# Patient Record
Sex: MALE | Race: WHITE | NOT HISPANIC OR LATINO | Employment: PART TIME | URBAN - METROPOLITAN AREA
[De-identification: names, ages, dates, MRNs, and addresses within clinical notes are randomized per-mention and may not be internally consistent; named-entity substitution may affect disease eponyms.]

---

## 2024-02-02 ENCOUNTER — OFFICE VISIT (OUTPATIENT)
Dept: URGENT CARE | Facility: CLINIC | Age: 64
End: 2024-02-02

## 2024-02-02 VITALS
TEMPERATURE: 101 F | DIASTOLIC BLOOD PRESSURE: 75 MMHG | HEART RATE: 46 BPM | RESPIRATION RATE: 16 BRPM | HEIGHT: 68 IN | SYSTOLIC BLOOD PRESSURE: 121 MMHG | OXYGEN SATURATION: 97 %

## 2024-02-02 DIAGNOSIS — U07.1 COVID-19: ICD-10-CM

## 2024-02-02 DIAGNOSIS — G40.409 OTHER GENERALIZED EPILEPSY, NOT INTRACTABLE, WITHOUT STATUS EPILEPTICUS: ICD-10-CM

## 2024-02-02 DIAGNOSIS — U07.1 COVID-19 VIRUS DETECTED: ICD-10-CM

## 2024-02-02 DIAGNOSIS — R50.9 FEVER, UNSPECIFIED FEVER CAUSE: Primary | ICD-10-CM

## 2024-02-02 LAB
CTP QC/QA: YES
CTP QC/QA: YES
POC MOLECULAR INFLUENZA A AGN: NEGATIVE
POC MOLECULAR INFLUENZA B AGN: NEGATIVE
SARS-COV-2 AG RESP QL IA.RAPID: POSITIVE

## 2024-02-02 PROCEDURE — 87811 SARS-COV-2 COVID19 W/OPTIC: CPT | Mod: QW,S$GLB,, | Performed by: NURSE PRACTITIONER

## 2024-02-02 PROCEDURE — 99204 OFFICE O/P NEW MOD 45 MIN: CPT | Mod: S$GLB,,, | Performed by: NURSE PRACTITIONER

## 2024-02-02 PROCEDURE — 87502 INFLUENZA DNA AMP PROBE: CPT | Mod: QW,S$GLB,, | Performed by: NURSE PRACTITIONER

## 2024-02-02 RX ORDER — IBUPROFEN 200 MG
400 TABLET ORAL
Status: COMPLETED | OUTPATIENT
Start: 2024-02-02 | End: 2024-02-02

## 2024-02-02 RX ORDER — HYDROCHLOROTHIAZIDE 25 MG/1
25 TABLET ORAL DAILY
COMMUNITY

## 2024-02-02 RX ORDER — AMLODIPINE BESYLATE 5 MG/1
5 TABLET ORAL DAILY
COMMUNITY

## 2024-02-02 RX ADMIN — Medication 400 MG: at 01:02

## 2024-02-02 NOTE — PATIENT INSTRUCTIONS
"Fever, unspecified fever cause  -     SARS Coronavirus 2 Antigen, POCT Manual Read  -     POCT Influenza A/B MOLECULAR    -     ibuprofen tablet 400 mg   - given in clinic @ 1:25 pm     COVID-19    Other generalized epilepsy    -     Ambulatory referral/consult to Neurology  Referral ordered.  Call [phone] to schedule appt         STRICT ED Precautions    Report to ED or call 911 if fever, vomiting, mental confusion, neck pain,  vision changes, speech changes, weakness, or sensory loss.        - Rest at home.     - Drink plenty of fluids so you won't get dehydrated.      Avoid taking Decongestants such as Sudafed, pseudoephedrine, phenylephrine or meds that say "cold," "sinus" or "-D".      - Cough recommendations:   Warm tea with honey can help with cough. Honey is a natural cough suppressant.  - Dextromethorphan (DM) is a cough suppressant over the counter (ie. mucinex DM OR delsym).        - Congestion recommendations:    - Mucinex (guaifenesin) twice a day (or as directed) to help loosen mucous.       - Fever/Pain recommendations:  Alternate Tylenol or Ibuprofen as directed for fever/pain.   - Motrin/Ibuprofen every 6-8 hours for pain and inflammation. Do not take ibuprofen if you have a history of GI bleeding, kidney disease, or if you take blood thinners.    - Tylenol/acetaminophen every 6-8 hours for added pain relief.  Avoid tylenol if you have a history of liver disease.       -Sore throat recommendations: Warm fluids, warm salt water gargles, throat lozenges, tea, honey, soup, or drinking something cold or frozen.  Throat lozenges or sprays help reduce pain. Gargling with warm saltwater (1/4 teaspoon of salt in 1/2 cup of warm water) or an OTC anesthetic gargle may be useful for irritation.      - Go to the ER if you develop new or worsening symptoms.      When to seek medical advice  Call your healthcare provider right away if any of these occur:  Fever that is poorly controlled with OTC fever " reducing medication  New or worsening ear pain, sinus pain, or headache  Stiff neck  You can't swallow liquids or you can't open your mouth wide because of throat pain  Signs of dehydration. These include very dark urine or no urine, sunken eyes, and dizziness.  Trouble breathing or noisy breathing  Muffled voice  Rash

## 2024-02-02 NOTE — PROGRESS NOTES
"Subjective:      Patient ID: Julio Shaw is a 63 y.o. male.    Vitals:  height is 5' 8" (1.727 m). His oral temperature is 100.6 °F (38.1 °C) (abnormal). His blood pressure is 121/75 and his pulse is 46 (abnormal). His respiration is 16 and oxygen saturation is 97%.     Chief Complaint: Loss of Consciousness      Pt is a 62 yo male with a friend who is participating in PMH and HPI.  Pt has hx epilepsy and HTN presenting with fever, fatigue, and an episode of passing out that occurred yesterday.  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  Pt reports that the episode after gaining consciousness took about 5 mins.  Pt reports that he struggled opening eyes , moving mouth and trying to speak. Pt reports usually his epileptic seizures are much more severe involving biting the tongue, urination, and total memory loss for ~ 1 hour.  Pt reports his last seizure was ~ 1 hour ago.  Denies chest pain, dyspnea on exertion, irregular heartbeat, blurred vision, leg swelling, and syncope.     Loss of Consciousness  This is a new problem. The current episode started yesterday. Length of episode of loss of consciousness: unknown. Nothing aggravates the symptoms. Associated symptoms include a fever and malaise/fatigue. Pertinent negatives include no abdominal pain, auditory change, aura, back pain, bladder incontinence, bowel incontinence, chest pain, clumsiness, confusion, diaphoresis, dizziness, focal sensory loss, focal weakness, headaches, light-headedness, nausea, palpitations, slurred speech, vertigo, visual change, vomiting or weakness. Associated symptoms comments: Fatigue   . He has tried nothing for the symptoms. The treatment provided no relief. His past medical history is significant for HTN. There is no history of arrhythmia, CAD, a clotting disorder, CVA, DM, seizures, a sudden death in family, TIA or vertigo. hypertension, epiplepsy       Constitution: Positive for fatigue " and fever. Negative for chills and sweating.   HENT:  Negative for ear pain, congestion, sinus pain and sore throat.    Cardiovascular:  Positive for passing out. Negative for chest pain, leg swelling and palpitations.   Respiratory:  Negative for chest tightness, cough, sputum production and shortness of breath.    Gastrointestinal:  Negative for abdominal pain, nausea, vomiting, diarrhea and bowel incontinence.   Genitourinary:  Negative for bladder incontinence.   Musculoskeletal:  Negative for back pain.   Neurological:  Positive for passing out, loss of consciousness and seizures. Negative for dizziness, history of vertigo, light-headedness, facial drooping, focal weakness, speech difficulty, coordination disturbances, loss of balance, headaches, disorientation, altered mental status, numbness and tingling.   Hematologic/Lymphatic: Negative for history of blood clots.   Psychiatric/Behavioral:  Negative for altered mental status, disorientation and confusion.       Objective:     Physical Exam   Constitutional: He is oriented to person, place, and time.   HENT:   Head: Normocephalic.   Ears:   Right Ear: Hearing and external ear normal. No no drainage, swelling or tenderness. Tympanic membrane is not erythematous, not retracted and not bulging. No middle ear effusion.   Left Ear: Hearing and external ear normal. No no drainage, swelling or tenderness. Tympanic membrane is not erythematous, not retracted and not bulging.  No middle ear effusion.   Nose: Nose normal. No mucosal edema, rhinorrhea or purulent discharge. Right sinus exhibits no maxillary sinus tenderness and no frontal sinus tenderness. Left sinus exhibits no maxillary sinus tenderness and no frontal sinus tenderness.   Mouth/Throat: Uvula is midline, oropharynx is clear and moist and mucous membranes are normal. No uvula swelling. No oropharyngeal exudate, posterior oropharyngeal edema or posterior oropharyngeal erythema.   Eyes: EOM are normal.  "Pupils are equal, round, and reactive to light. Right pupil is round, reactive and not sluggish. Left pupil is round, reactive and not sluggish. Pupils are equal.   Cardiovascular: Normal rate and regular rhythm.   Pulmonary/Chest: Effort normal and breath sounds normal. No respiratory distress.   Musculoskeletal: Normal range of motion.         General: Normal range of motion.   Neurological: no focal deficit. He is alert and oriented to person, place, and time. He has normal motor skills and normal sensation. Gait and coordination normal. GCS eye subscore is 4. GCS verbal subscore is 5. GCS motor subscore is 6.   Skin: Skin is warm and dry.   Nursing note and vitals reviewed.      Assessment:     1. Fever, unspecified fever cause    2. COVID-19    3. Other generalized epilepsy, not intractable, without status epilepticus        Plan:       Fever, unspecified fever cause  -     SARS Coronavirus 2 Antigen, POCT Manual Read  -     POCT Influenza A/B MOLECULAR  -     ibuprofen tablet 400 mg    COVID-19    Other generalized epilepsy, not intractable, without status epilepticus  -     Ambulatory referral/consult to Neurology      Patient Instructions   Fever, unspecified fever cause  -     SARS Coronavirus 2 Antigen, POCT Manual Read  -     POCT Influenza A/B MOLECULAR    -     ibuprofen tablet 400 mg   - given in clinic @ 1:25 pm     COVID-19    Other generalized epilepsy    -     Ambulatory referral/consult to Neurology  Referral ordered.  Call [phone] to schedule appt         STRICT ED Precautions    Report to ED or call 911 if fever, vomiting, mental confusion, neck pain,  vision changes, speech changes, weakness, or sensory loss.        - Rest at home.     - Drink plenty of fluids so you won't get dehydrated.      Avoid taking Decongestants such as Sudafed, pseudoephedrine, phenylephrine or meds that say "cold," "sinus" or "-D".      - Cough recommendations:   Warm tea with honey can help with cough. Honey is " a natural cough suppressant.  - Dextromethorphan (DM) is a cough suppressant over the counter (ie. mucinex DM OR delsym).        - Congestion recommendations:    - Mucinex (guaifenesin) twice a day (or as directed) to help loosen mucous.       - Fever/Pain recommendations:  Alternate Tylenol or Ibuprofen as directed for fever/pain.   - Motrin/Ibuprofen every 6-8 hours for pain and inflammation. Do not take ibuprofen if you have a history of GI bleeding, kidney disease, or if you take blood thinners.    - Tylenol/acetaminophen every 6-8 hours for added pain relief.  Avoid tylenol if you have a history of liver disease.       -Sore throat recommendations: Warm fluids, warm salt water gargles, throat lozenges, tea, honey, soup, or drinking something cold or frozen.  Throat lozenges or sprays help reduce pain. Gargling with warm saltwater (1/4 teaspoon of salt in 1/2 cup of warm water) or an OTC anesthetic gargle may be useful for irritation.      - Go to the ER if you develop new or worsening symptoms.      When to seek medical advice  Call your healthcare provider right away if any of these occur:  Fever that is poorly controlled with OTC fever reducing medication  New or worsening ear pain, sinus pain, or headache  Stiff neck  You can't swallow liquids or you can't open your mouth wide because of throat pain  Signs of dehydration. These include very dark urine or no urine, sunken eyes, and dizziness.  Trouble breathing or noisy breathing  Muffled voice  Rash

## 2025-05-22 ENCOUNTER — OFFICE VISIT (OUTPATIENT)
Dept: URGENT CARE | Facility: CLINIC | Age: 65
End: 2025-05-22

## 2025-05-22 VITALS
BODY MASS INDEX: 29.67 KG/M2 | OXYGEN SATURATION: 97 % | TEMPERATURE: 99 F | SYSTOLIC BLOOD PRESSURE: 138 MMHG | DIASTOLIC BLOOD PRESSURE: 87 MMHG | HEIGHT: 68 IN | HEART RATE: 76 BPM | WEIGHT: 195.75 LBS | RESPIRATION RATE: 20 BRPM

## 2025-05-22 DIAGNOSIS — Z76.0 ENCOUNTER FOR MEDICATION REFILL: Primary | ICD-10-CM

## 2025-05-22 PROCEDURE — 99214 OFFICE O/P EST MOD 30 MIN: CPT | Mod: S$GLB,,, | Performed by: PHYSICIAN ASSISTANT

## 2025-05-22 RX ORDER — PROPRANOLOL HYDROCHLORIDE 60 MG/1
60 TABLET ORAL 3 TIMES DAILY
COMMUNITY
End: 2025-05-22 | Stop reason: SDUPTHER

## 2025-05-22 RX ORDER — IRBESARTAN AND HYDROCHLOROTHIAZIDE 300; 12.5 MG/1; MG/1
1 TABLET, FILM COATED ORAL DAILY
COMMUNITY
End: 2025-05-22

## 2025-05-22 RX ORDER — AMLODIPINE BESYLATE 5 MG/1
5 TABLET ORAL DAILY
Qty: 90 TABLET | Refills: 0 | Status: SHIPPED | OUTPATIENT
Start: 2025-05-22

## 2025-05-22 RX ORDER — PROPRANOLOL HYDROCHLORIDE 60 MG/1
60 TABLET ORAL 3 TIMES DAILY
Qty: 90 TABLET | Refills: 0 | Status: SHIPPED | OUTPATIENT
Start: 2025-05-22

## 2025-05-22 RX ORDER — DIVALPROEX SODIUM 500 MG/1
1000 TABLET, FILM COATED, EXTENDED RELEASE ORAL DAILY
Qty: 180 TABLET | Refills: 0 | Status: SHIPPED | OUTPATIENT
Start: 2025-05-22 | End: 2025-08-20

## 2025-05-22 RX ORDER — IRBESARTAN 300 MG/1
300 TABLET ORAL NIGHTLY
Qty: 90 TABLET | Refills: 0 | Status: SHIPPED | OUTPATIENT
Start: 2025-05-22 | End: 2025-08-20

## 2025-05-22 RX ORDER — PREGABALIN 50 MG/1
50 CAPSULE ORAL 2 TIMES DAILY
Qty: 60 CAPSULE | Refills: 2 | Status: SHIPPED | OUTPATIENT
Start: 2025-05-22 | End: 2025-08-20

## 2025-05-22 NOTE — PROGRESS NOTES
"Subjective:      Patient ID: Julio Shaw is a 65 y.o. male.    Vitals:  height is 5' 8" (1.727 m) and weight is 88.8 kg (195 lb 12.3 oz). His oral temperature is 98.8 °F (37.1 °C). His blood pressure is 138/87 and his pulse is 76. His respiration is 20 and oxygen saturation is 97%.     Chief Complaint: Medication Refill    65 y.o. male presents with requests for medication refill.  Patient has a history of epilepsy, essential tremor, hypertension and chronic neuropathic pain in his feet.  Patient states he usually gets his medical care in [facility].  He was set to return to [facility] last week however is unable to fly at this time.  Says he has about 2 more weeks of medication left.  He does not have a primary care physician here in the [facility].  Says he usually spends 6 months in [facility] and 6 months in the [place].    Medication Refill  This is a new problem. The current episode started today. The problem has been unchanged. Pertinent negatives include no chest pain, fatigue or neck pain. Nothing aggravates the symptoms. He has tried nothing for the symptoms.       Constitution: Negative for activity change, appetite change and fatigue.   HENT:  Negative for facial trauma.    Neck: Negative for neck pain.   Cardiovascular:  Negative for chest pain.   Respiratory:  Negative for shortness of breath.    Gastrointestinal:  Negative for abdominal trauma.   Musculoskeletal:  Positive for pain.   Neurological:  Positive for tremors.      Objective:     Physical Exam   Constitutional: He is cooperative. No distress.   HENT:   Head: Normocephalic and atraumatic.   Ears:   Right Ear: Hearing and external ear normal.   Left Ear: Hearing and external ear normal.   Nose: Nose normal.   Eyes: Lids are normal. Right conjunctiva is not injected. Left conjunctiva is not injected. No scleral icterus.   Cardiovascular: Normal pulses.   Pulmonary/Chest: Effort normal. No respiratory distress.   Abdominal: " Normal appearance.   Neurological: He is alert. He has normal motor skills and normal sensation. He displays no weakness.      Comments: Tremors of both upper extremities noted with movement.   Nursing note and vitals reviewed.      Assessment:     1. Encounter for medication refill        Plan:     Advised to get back to [place] where he regularly gets medical care as soon as possible or to find a local primary care provider and neurologist for chronic disease management.  Patient verbalized understanding and agreement.      Encounter for medication refill  -     amLODIPine (NORVASC) 5 MG tablet; Take 1 tablet (5 mg total) by mouth once daily.  Dispense: 90 tablet; Refill: 0  -     propranoloL (INDERAL) 60 MG tablet; Take 1 tablet (60 mg total) by mouth 3 (three) times daily.  Dispense: 90 tablet; Refill: 0  -     divalproex ER (DEPAKOTE ER) 500 MG Tb24 24 hr tablet; Take 2 tablets (1,000 mg total) by mouth once daily.  Dispense: 180 tablet; Refill: 0  -     pregabalin (LYRICA) 50 MG capsule; Take 1 capsule (50 mg total) by mouth 2 (two) times daily.  Dispense: 60 capsule; Refill: 2  -     irbesartan (AVAPRO) 300 MG tablet; Take 1 tablet (300 mg total) by mouth every evening.  Dispense: 90 tablet; Refill: 0      Patient Instructions   Please review attached instructions.    You must understand that you've received an Urgent Care treatment only and that you may be released before all your medical problems are known or treated. You, the patient, will arrange for follow up care as instructed.  Follow up with your PCP or specialty clinic as directed in the next 1-2 weeks if not improved or as needed.  You may call [phone] to schedule an appointment with the appropriate provider.  If your condition worsens we recommend that you receive another evaluation at the emergency room immediately or contact your primary medical clinics after hours call service to discuss your concerns.    If you were prescribed a narcotic  or controlled medication, do not drive or operate heavy equipment or machinery while taking these medications.    If you smoke, please stop smoking.

## 2025-05-22 NOTE — PATIENT INSTRUCTIONS
Please review attached instructions.    You must understand that you've received an Urgent Care treatment only and that you may be released before all your medical problems are known or treated. You, the patient, will arrange for follow up care as instructed.  Follow up with your PCP or specialty clinic as directed in the next 1-2 weeks if not improved or as needed.  You may call [phone] to schedule an appointment with the appropriate provider.  If your condition worsens we recommend that you receive another evaluation at the emergency room immediately or contact your primary medical clinics after hours call service to discuss your concerns.    If you were prescribed a narcotic or controlled medication, do not drive or operate heavy equipment or machinery while taking these medications.    If you smoke, please stop smoking.

## 2025-08-12 DIAGNOSIS — R25.1 TREMOR, UNSPECIFIED: Primary | ICD-10-CM
